# Patient Record
Sex: FEMALE | Race: BLACK OR AFRICAN AMERICAN
[De-identification: names, ages, dates, MRNs, and addresses within clinical notes are randomized per-mention and may not be internally consistent; named-entity substitution may affect disease eponyms.]

---

## 2020-10-08 ENCOUNTER — HOSPITAL ENCOUNTER (INPATIENT)
Dept: HOSPITAL 62 - LC | Age: 20
LOS: 2 days | Discharge: HOME | End: 2020-10-10
Attending: OBSTETRICS & GYNECOLOGY | Admitting: OBSTETRICS & GYNECOLOGY
Payer: MEDICAID

## 2020-10-08 DIAGNOSIS — O32.2XX0: ICD-10-CM

## 2020-10-08 DIAGNOSIS — Z3A.39: ICD-10-CM

## 2020-10-08 LAB
ADD MANUAL DIFF: NO
APPEARANCE UR: (no result)
APTT PPP: YELLOW S
BARBITURATES UR QL SCN: NEGATIVE
BASOPHILS # BLD AUTO: 0 10^3/UL (ref 0–0.2)
BASOPHILS NFR BLD AUTO: 0.2 % (ref 0–2)
BILIRUB UR QL STRIP: NEGATIVE
EOSINOPHIL # BLD AUTO: 0 10^3/UL (ref 0–0.6)
EOSINOPHIL NFR BLD AUTO: 0 % (ref 0–6)
ERYTHROCYTE [DISTWIDTH] IN BLOOD BY AUTOMATED COUNT: 14.6 % (ref 11.5–14)
GLUCOSE UR STRIP-MCNC: NEGATIVE MG/DL
HCT VFR BLD CALC: 38.7 % (ref 36–47)
HGB BLD-MCNC: 13.2 G/DL (ref 12–15.5)
KETONES UR STRIP-MCNC: NEGATIVE MG/DL
LYMPHOCYTES # BLD AUTO: 0.8 10^3/UL (ref 0.5–4.7)
LYMPHOCYTES NFR BLD AUTO: 5.7 % (ref 13–45)
MCH RBC QN AUTO: 29.5 PG (ref 27–33.4)
MCHC RBC AUTO-ENTMCNC: 34.1 G/DL (ref 32–36)
MCV RBC AUTO: 86 FL (ref 80–97)
METHADONE UR QL SCN: NEGATIVE
MONOCYTES # BLD AUTO: 0.4 10^3/UL (ref 0.1–1.4)
MONOCYTES NFR BLD AUTO: 2.9 % (ref 3–13)
NEUTROPHILS # BLD AUTO: 13.2 10^3/UL (ref 1.7–8.2)
NEUTS SEG NFR BLD AUTO: 91.2 % (ref 42–78)
NITRITE UR QL STRIP: NEGATIVE
PCP UR QL SCN: NEGATIVE
PH UR STRIP: 6 [PH] (ref 5–9)
PLATELET # BLD: 152 10^3/UL (ref 150–450)
PROT UR STRIP-MCNC: 30 MG/DL
RBC # BLD AUTO: 4.48 10^6/UL (ref 3.72–5.28)
SP GR UR STRIP: 1.02
TOTAL CELLS COUNTED % (AUTO): 100 %
URINE AMPHETAMINES SCREEN: NEGATIVE
URINE BENZODIAZEPINES SCREEN: NEGATIVE
URINE COCAINE SCREEN: NEGATIVE
URINE MARIJUANA (THC) SCREEN: NEGATIVE
UROBILINOGEN UR-MCNC: NEGATIVE MG/DL (ref ?–2)
WBC # BLD AUTO: 14.5 10^3/UL (ref 4–10.5)

## 2020-10-08 PROCEDURE — 86900 BLOOD TYPING SEROLOGIC ABO: CPT

## 2020-10-08 PROCEDURE — 81005 URINALYSIS: CPT

## 2020-10-08 PROCEDURE — 86901 BLOOD TYPING SEROLOGIC RH(D): CPT

## 2020-10-08 PROCEDURE — 85027 COMPLETE CBC AUTOMATED: CPT

## 2020-10-08 PROCEDURE — 85025 COMPLETE CBC W/AUTO DIFF WBC: CPT

## 2020-10-08 PROCEDURE — 88307 TISSUE EXAM BY PATHOLOGIST: CPT

## 2020-10-08 PROCEDURE — 80307 DRUG TEST PRSMV CHEM ANLYZR: CPT

## 2020-10-08 PROCEDURE — 36415 COLL VENOUS BLD VENIPUNCTURE: CPT

## 2020-10-08 PROCEDURE — 86592 SYPHILIS TEST NON-TREP QUAL: CPT

## 2020-10-08 PROCEDURE — 0HQ9XZZ REPAIR PERINEUM SKIN, EXTERNAL APPROACH: ICD-10-PCS | Performed by: MIDWIFE

## 2020-10-08 PROCEDURE — 86850 RBC ANTIBODY SCREEN: CPT

## 2020-10-08 RX ADMIN — FAMOTIDINE SCH: 20 TABLET, FILM COATED ORAL at 23:50

## 2020-10-08 RX ADMIN — METHYLERGONOVINE SCH MG: 0.2 TABLET ORAL at 23:47

## 2020-10-08 RX ADMIN — IBUPROFEN SCH MG: 800 TABLET, FILM COATED ORAL at 23:47

## 2020-10-08 NOTE — ADMISSION PHYSICAL
=================================================================



=================================================================

Datetime Report Generated by CPN: 10/08/2020 12:45

   

   

=================================================================

CURRENT ADMISSION

=================================================================

   

Chief Complaint:  Uterine Contractions

Indication for Induction:  Not Applicable

Admit Impression :  Term, Intrauterine Pregnancy; Active Labor

Admit Plan:  Admit to Unit; Initiate Labor Protocol

   

=================================================================

ALLERGIES

=================================================================

   

Medication Allergies:  Yes

Medication Allergies:  Penicillins (10/08/)

Latex:  No Latex Allergies

   

=================================================================

OBSTETRICAL HISTORY

=================================================================

   

EDC:  10/12/2020 00:00

:  2

Para:  1

Term:  1

:  0

SAB:  0

IAB:  0

Ectopic:  0

Livin

Cesareans:  0

VBACs:  0

Multiple Births:  0

Gestational Diabetes:  No

Rh Sensitization:  No

Incompetent Cervix:  No

FRANCISCO:  No

Infertility:  No

ART Treatment:  No

Uterine Anomaly:  No

IUGR:  No

Hx Previous C/S:  No

Macrosomia:  No

Hx Loss/Stillborn:  No

PIH:  No

Hx  Death:  No

Placenta Previa/Abruption:  No

Depression/PP Depression:  No

PTL/PROM:  No

Post Partum Hemorrhage:  No

Current Pregnancy Procedures:  Ultrasound

Obstetrical History Comments:  2019 , no epdiural, girl 

   G2- Current 

   

=================================================================

***SEE PRENATAL RECORDS***

=================================================================

   

Alcohol:  No

Marijuana :  No

Cocaine:  No

Other Illicit Drugs:  No

Cigarettes:  Never Smoker. [id number]

   

=================================================================

MEDICAL HISTORY

=================================================================

   

Diabetes:  No

Blood Transfusion:  No

Pulmonary Disease (Asthma, TB):  No

Breast Disease:  No

Hypertension:  No

Gyn Surgery:  No

Heart Disease:  No

Hosp/Surgery:  Yes

Autoimmune Disorder:  No

Anesthetic Complications:  No

Kidney Disease:  No

Abnormal Pap Smear:  Yes

Neuro/Epilepsy:  No

Psychiatric Disorders:  No

Other Medical Diseases:  No

Hepatitis/Liver Disease:  No

Significant Family History:  No

Varicosities/Phlebitis:  No

Trauma/Violence :  No

Thyroid Dysfunction:  No

Medical History Comments:  Was suppose to go back for a 2nd pap smear

   but got pregnant and never got tested again. 

   

=================================================================

INFECTIOUS HISTORY

=================================================================

   

Gonorrhea:  No

Genital Herpes:  No

Chlamydia:  No

Tuberculosis:  No

Syphilis:  No

Hepatitis:  No

HIV/AIDS Exposure:  No

Rash or Viral Illness:  No

HPV:  No

   

=================================================================

PHYSICAL EXAM

=================================================================

   

General:  Normal

HEENT:  Normal

Neurologic:  Normal

Thyroid:  Deferred

Heart:  Normal

Lungs:  Normal

Breast:  Deferred

Back:  Normal

Abdomen:  Normal

Genitourinary Exam:  Deferred

Extremities:  Normal

DTRs:  Normal

Pelvic Type:  Adequate

Vital Signs:  Reviewed

   

=================================================================

VAGINAL EXAM

=================================================================

   

Dilatation:  5

Effacement:  100

Station:  -1

   

=================================================================

MEMBRANES

=================================================================

   

Membranes:  Intact

   

=================================================================

FETUS A

=================================================================

   

EGA:  39.3

Monitoring:  External US

FHR- Baseline:  130

Variability:  Moderate 6-25bpm

Accelerations:  15X15

FHR Category:  Category I

Fetal Presentation:  Vertex

Admit Comment:  

   Closely-spaced pregnancies

   Prenatal records on chart

   Plan admit, pain management.

   Anticipate 

   Proven for 7lbs 10 oz

   

=================================================================

PLANS FOR LABOR AND DELIVERY

=================================================================

   

Labor and Delivery:  None

Pain Management:  None

Feeding Preference:  Breast

Benefit of Breast Feed Discussed:  Yes

Circumcision:  Yes

   

=================================================================

INFORMED CONSENT

=================================================================

   

Assignment:  Kelsey Orourke MD

Signature:  Electronically signed by Rosalina Palmer CNM on 10/8/2020 at

   12:45  with User ID: [id number]

:  Electronically signed by Rosalina Palmer CNM on 10/8/2020 at 12:45 

   with User ID: [id number]

:  I personally evaluated and examined the patient in conjunction with

   the MLP and agree with the assessment, treatment plan and

   disposition.

## 2020-10-08 NOTE — BIRTH CERTIFICATE DATA
=================================================================

Birth Cert Data

=================================================================

Datetime Report Generated by ABIODUN: 10/08/2020 21:15

   

   

=================================================================

BIRTH CERTIFICATE DATA

=================================================================

   

 47a. Prenatal Care:  Yes    (10/08/2020 10:12:Maggie Franklin RN)

 47b. Date of First Visit:  2020 00:00    (10/08/2020

   10:12:Aleta Bahena RN)

 47c. Date of Last Visit:  10/06/2020 00:00    (10/08/2020

   10:12:Aleta Bahena RN)

 47d. Number of Prenatal Visits:  12    (10/08/2020 10:12:Aleta Bahena RN)

 48a. Number of Prev Live Births:  1    (10/08/2020 10:12:Maggie Franklin RN)

 48b. Now Livin    (10/08/2020 10:12:Maggie Franklin RN)

 48c. Live Births Now Dead:  0    (10/08/2020 10:12:QS system process)

 48d. Date of Last Live Birth:  2019 00:00    (10/08/2020

   10:12:Maggie Franklin RN)

 48e. Pregnancy Losses:  0    (10/08/2020 10:12:Maggie Franklin RN)

   

=================================================================

RISK FACTORS IN THIS PREGNANCY

=================================================================

   

 49a. Diabetes:  No    (10/08/2020 10:12:Maggie Franklin RN)

 49b. Hypertension:  No    (10/08/2020 10:12:Maggie Franklin RN)

 49c. Previous  Births:  0    (10/08/2020 10:12:Maggie Franklin RN)

 49d. Stillborns:  No    (10/08/2020 10:12:Maggie Franklin RN)

 49d. IUGR:  No    (10/08/2020 10:12:Maggie Franklin RN)

 49e. Infertility Treatment:  No    (10/08/2020 10:12:Maggie Franklin RN)

 49f. Previous Cesareans:  0    (10/08/2020 10:12:Maggie Franklin RN)

   

=================================================================

Mother's Height

=================================================================

   

 50b. Height Inches:  67    (10/08/2020 10:02:QS system process)

   

=================================================================

Mother's Weight 

=================================================================

   

 51a. Pre-Pregnancy Weight (lbs):  180    (10/08/2020 10:12:Maggie Franklin RN)

 51b. Weight at Delivery (lbs):  209    (10/08/2020 10:02:QS system

   process)

 52. Dt Last Normal Menses Began:  2020 00:00    (10/08/2020

   10:12:Maggie Franklin RN)

   

=================================================================

Infections Present/Treated

=================================================================

   

 53a. Gonorrhea:  No    (10/08/2020 10:12:Maggie Franklin RN)

 Results this Hospital Visit :  Negative    (10/08/2020 10:12:BARBRA Will)

 53b. Syphilis:  No    (10/08/2020 10:12:Maggie Franklin RN)

 53c. Chlamydia:  No    (10/08/2020 10:12:Maggie Franklin RN)

 Results this Hospital Visit:  Negative    (10/08/2020 10:12:BARBRA Will)

 53d. Hepatitis B:  No    (10/08/2020 10:12:Maggie Franklin RN)

 Results this Hospital Visit:  Negative    (10/08/2020 10:12:Maggie Franklin RN)

 53e. Hepatitis C:  Negative    (10/08/2020 10:12:Aleta Bahena RN)

 53h. Mother Tested for HBsAG:  Yes    (10/08/2020 10:12:BARBRA Will)

 53i. Date Tested:  2020 00:00    (10/08/2020 10:12:BARBRA Will)

 53j. Test Result:  Negative    (10/08/2020 10:12:Maggie Franklin RN)

   

=================================================================

Obstetric Procedures 

=================================================================

   

 54a, b, c. Obstetric Procedures:  Ultrasound    (10/08/2020

   10:12:Maggie Franklin RN)

   

=================================================================

Cigarette Smoking 

=================================================================

   

 Cigarette Smoking:  Never Smoker. [id number]    (10/08/2020

   10:12:Maggie Franklin RN)

 55a. 3 Months Before Preg - Ci    (10/08/2020 10:12:Maggie Franklin RN)

   55a. Packs:  0    (10/08/2020 10:12:Maggie Franklin RN)

 55b. 1st Trimester of Preg- Ci    (10/08/2020 10:12:Maggie Franklin RN)

   55b. Packs:  0    (10/08/2020 10:12:Maggie Franklin RN)

 55c. 2nd Trimester of Preg- Ci    (10/08/2020 10:12:Maggie Franklin RN)

   55c. Packs:  0    (10/08/2020 10:12:Maggie Franklin RN)

 55d. 3rd Trimester of Preg- Ci    (10/08/2020 10:12:Maggie Franklin RN)

   55d. Packs:  0    (10/08/2020 10:12:Maggie Franklin RN)

   

=================================================================

Onset of Labor

=================================================================

   

 56a. PROM >12 Hrs:  1.87    (10/08/2020 10:12:QS system process)

 56b. Precipitous Labor <3 Hrs:  4    (10/08/2020 10:12:QS system

   process)

 56c. Prolonged Labor > 20 Hrs:  4    (10/08/2020 10:12:QS system

   process)

   

=================================================================



=================================================================

   

 57a. Induction of Labor:  N/A    (10/08/2020 10:12:Swati Camp, [facility])

 57c. Non-Vertex Presentation A:  Vertex    (10/08/2020 10:12:Swati Edmonds [facility])

 57d. Steroids - Fetal Lung Mat:  None    (10/08/2020 10:12:Rosalina Palmer CNM)

 57d. Steroids - Fetal Lung Mat:  Not Applicable    (10/08/2020

   10:12:Swati Edmonds [facility])

 57f. Mat Chorio or Temp >100.4:  98.4    (10/08/2020 10:12:Dylon Padilla RN)

 57g. Moderate/Heavy Meconium:  Clear    (10/08/2020 16:33:Maggie Franklin RN)

 57h. Fetal Intolerance of Labor:  N/A    (10/08/2020 10:12:Swati Edmonds

   [facility])

:  N/A    (10/08/2020 10:12:Swati Edmonds [facility])

 57i. Epidural/Spinal Anesthesia:  None    (10/08/2020 10:12:Swati Edmonds [facility])

   

=================================================================

Method of Delivery

=================================================================

   

 58a. Forceps - Unsuccessful A:  N/A    (10/08/2020 10:12:Swati Edmonds

   [facility])

 58b. Vacuum - Unsuccessful A:  N/A    (10/08/2020 10:12:Swati Edmonds,

   [facility])

   

=================================================================

58c. Presentation at Birth

=================================================================

   

 58c. Presentation at Birth - A :  Vertex    (10/08/2020 10:12:[facility], [facility])

 58c. Presentation at Birth - A :  N/A    (10/08/2020 10:12:[facility])

 58c. Presentation at Birth - A :  Cephalic    (10/08/2020 10:06:Maggie Franklin, RN)

   

=================================================================

Final Route and Method of Del 

=================================================================

   

 58d. Baby A Route/Delivery:  Vaginal    (10/08/2020 10:12:[facility])

 58e. Trial of Labor Attempted:  No    (10/08/2020 10:12:[facility])

 58e. Trial of Labor Attempted A:  N/A    (10/08/2020 10:12:[facility])

   

=================================================================

Maternal Morbidity

=================================================================

   

 59b. 3rd or 4th Degree Lacs:  Perineal    (10/08/2020 10:12:Rosalina

   Palmer, CNM)

   

=================================================================



=================================================================

   

 Birthweight Baby A:  3460    (10/08/2020 10:12:Dylon Padilla RN)

 60a. Pounds :  7    (10/08/2020 10:12:QS system process)

 60b. Ounces:  10    (10/08/2020 10:12:QS system process)

   

=================================================================

61. GA at Delivery 

=================================================================

   

 Baby A:  38.3    (10/08/2020 10:12:[facility], [facility])

:  Early Term- 37- 38.6 Weeks    (10/08/2020 10:12:QS system process)

   

=================================================================

62a. APGAR 5 Minute

=================================================================

   

 Baby A:  9    (10/08/2020 10:12:QS system process)

## 2020-10-09 LAB
ERYTHROCYTE [DISTWIDTH] IN BLOOD BY AUTOMATED COUNT: 14.4 % (ref 11.5–14)
HCT VFR BLD CALC: 35.2 % (ref 36–47)
HGB BLD-MCNC: 11.7 G/DL (ref 12–15.5)
MCH RBC QN AUTO: 29 PG (ref 27–33.4)
MCHC RBC AUTO-ENTMCNC: 33.2 G/DL (ref 32–36)
MCV RBC AUTO: 87 FL (ref 80–97)
PLATELET # BLD: 138 10^3/UL (ref 150–450)
RBC # BLD AUTO: 4.04 10^6/UL (ref 3.72–5.28)
WBC # BLD AUTO: 12.4 10^3/UL (ref 4–10.5)

## 2020-10-09 RX ADMIN — FAMOTIDINE SCH MG: 20 TABLET, FILM COATED ORAL at 09:49

## 2020-10-09 RX ADMIN — DOCUSATE SODIUM SCH MG: 100 CAPSULE, LIQUID FILLED ORAL at 17:29

## 2020-10-09 RX ADMIN — FAMOTIDINE SCH MG: 20 TABLET, FILM COATED ORAL at 21:59

## 2020-10-09 RX ADMIN — IBUPROFEN SCH MG: 800 TABLET, FILM COATED ORAL at 21:59

## 2020-10-09 RX ADMIN — METHYLERGONOVINE SCH MG: 0.2 TABLET ORAL at 21:59

## 2020-10-09 RX ADMIN — DOCUSATE SODIUM SCH MG: 100 CAPSULE, LIQUID FILLED ORAL at 09:49

## 2020-10-09 RX ADMIN — METHYLERGONOVINE SCH MG: 0.2 TABLET ORAL at 17:29

## 2020-10-09 RX ADMIN — IBUPROFEN SCH MG: 800 TABLET, FILM COATED ORAL at 06:42

## 2020-10-09 RX ADMIN — METHYLERGONOVINE SCH MG: 0.2 TABLET ORAL at 04:18

## 2020-10-09 RX ADMIN — Medication SCH CAP: at 09:49

## 2020-10-09 RX ADMIN — METHYLERGONOVINE SCH MG: 0.2 TABLET ORAL at 09:49

## 2020-10-09 RX ADMIN — FERROUS SULFATE TAB 325 MG (65 MG ELEMENTAL FE) SCH MG: 325 (65 FE) TAB at 17:29

## 2020-10-09 RX ADMIN — SENNOSIDES, DOCUSATE SODIUM SCH EACH: 50; 8.6 TABLET, FILM COATED ORAL at 09:49

## 2020-10-09 RX ADMIN — IBUPROFEN SCH MG: 800 TABLET, FILM COATED ORAL at 14:20

## 2020-10-09 RX ADMIN — FERROUS SULFATE TAB 325 MG (65 MG ELEMENTAL FE) SCH MG: 325 (65 FE) TAB at 09:49

## 2020-10-09 NOTE — PDOC PROGRESS REPORT
Subjective-OB


Progress Note for:: 10/09/20


Subjective: 


Pt doing well, no concerns. Bleeding normal, reg diet and voids w/o difficulty. 







Physical Exam (OB)


Vital Signs: 


                                        











Temp Pulse Resp BP Pulse Ox


 


 98.1 F   92   18   114/73   100 


 


 10/09/20 11:20  10/09/20 11:20  10/09/20 11:20  10/09/20 11:20  10/09/20 07:50








                                 Intake & Output











 10/08/20 10/09/20 10/10/20





 06:59 06:59 06:59


 


Output Total  350 120


 


Balance  -350 -120


 


Weight  94.9 kg 














- PIH/Pre-Eclampsia


Clonus: Negative


Headache: Absent


Epigastric Pain: No


Visual Changes: No





- Maternal Morbidity


59. Maternal Morbidity (serious complications experinced by the mother 

associated with labor and delivery: None of the above





- Lochia


Lochia Amount: Small 10-25 ml


Lochia Color: Rubra/Red





- Abdomen


Description: Tender


Hernia Present: No


Fundal Description: Firm, Midline


Fundal Height: u/u - u/2





Objective-Diagnostic


Laboratory: 


                                        





                                 10/09/20 07:25 





                                        











  10/08/20 10/08/20 10/09/20





  14:00 14:00 07:25


 


WBC  14.5 H   12.4 H


 


RBC  4.48   4.04


 


Hgb  13.2   11.7 L


 


Hct  38.7   35.2 L


 


MCV  86   87


 


MCH  29.5   29.0


 


MCHC  34.1   33.2


 


RDW  14.6 H   14.4 H


 


Plt Count  152   138 L


 


Seg Neutrophils %  91.2 H  


 


Blood Type   B POSITIVE 


 


Antibody Screen   NEGATIVE 














Assessment and Plan(PN)





- Assessment and Plan


(1) First degree perineal laceration during delivery


Is this a current diagnosis for this admission?: Yes   





(2) Active labor at term


Is this a current diagnosis for this admission?: Yes   





(3)  (normal spontaneous vaginal delivery)


Is this a current diagnosis for this admission?: Yes   





- Time Spent with Patient


Time with patient: Less than 15 minutes


Medications reviewed and adjusted accordingly: Yes





- Disposition


Anticipated Discharge Disposition: Home, Self Care


Anticipated Discharge Timeframe: within 48 hours

## 2020-10-10 VITALS — DIASTOLIC BLOOD PRESSURE: 69 MMHG | SYSTOLIC BLOOD PRESSURE: 132 MMHG

## 2020-10-10 RX ADMIN — IBUPROFEN SCH MG: 800 TABLET, FILM COATED ORAL at 05:19

## 2020-10-10 RX ADMIN — FERROUS SULFATE TAB 325 MG (65 MG ELEMENTAL FE) SCH MG: 325 (65 FE) TAB at 09:48

## 2020-10-10 RX ADMIN — Medication SCH CAP: at 09:48

## 2020-10-10 RX ADMIN — FAMOTIDINE SCH MG: 20 TABLET, FILM COATED ORAL at 09:48

## 2020-10-10 RX ADMIN — SENNOSIDES, DOCUSATE SODIUM SCH EACH: 50; 8.6 TABLET, FILM COATED ORAL at 09:49

## 2020-10-10 RX ADMIN — DOCUSATE SODIUM SCH MG: 100 CAPSULE, LIQUID FILLED ORAL at 09:48

## 2020-10-10 NOTE — PDOC DISCHARGE SUMMARY
Impression





- Admit/DC Date/PCP


Admission Date/Primary Care Provider: 


  10/08/20 12:07





  CESAR GARCIA MD





Discharge Date: 10/10/20





- Discharge Diagnosis


(1) First degree perineal laceration during delivery


Is this a current diagnosis for this admission?: Yes   





(2) Active labor at term


Is this a current diagnosis for this admission?: Yes   





(3)  (normal spontaneous vaginal delivery)


Is this a current diagnosis for this admission?: Yes   





- Additional Information


Resuscitation Status: Full Code


Discharge Diet: Regular


Discharge Activity: Balance Activity w/Rest, Pelvic Rest


Referrals: 


CESRA GARCIA MD [Primary Care Provider] - 


Prescriptions: 


Ibuprofen [Motrin 800 mg Tablet] 800 mg PO Q8HP PRN #60 tablet


 PRN Reason: 


Home Medications: 








Pnv No.95/Ferrous Fum/Folic AC [Prenatal Caplet] 1 each PO DAILY 10/08/20 


Ibuprofen [Motrin 800 mg Tablet] 800 mg PO Q8HP PRN #60 tablet 10/10/20 











HPI


Gestational Age: 39.3


Reason(s) for Admission: Onset of Labor


Prenatal Procedures: NST


Intrapartum Procedure(s): Spontaneous Vaginal Delivery


Postpartum Complication(s): Laceration-Perineal, Hemorrhage-Uterine Atony


Laceration-Degree: 1st


Postpartum Complication(s) Note: 





given methergine





Hospital Course


59. Maternal Morbidity (serious complications experinced by the mother 

associated with labor and delivery: None of the above





Results


Laboratory Results: 


                                        











WBC  12.4 10^3/uL (4.0-10.5)  H  10/09/20  07:25    


 


RBC  4.04 10^6/uL (3.72-5.28)   10/09/20  07:25    


 


Hgb  11.7 g/dL (12.0-15.5)  L  10/09/20  07:25    


 


Hct  35.2 % (36.0-47.0)  L  10/09/20  07:25    


 


MCV  87 fl (80-97)   10/09/20  07:25    


 


MCH  29.0 pg (27.0-33.4)   10/09/20  07:25    


 


MCHC  33.2 g/dL (32.0-36.0)   10/09/20  07:25    


 


RDW  14.4 % (11.5-14.0)  H  10/09/20  07:25    


 


Plt Count  138 10^3/uL (150-450)  L  10/09/20  07:25    


 


Lymph % (Auto)  5.7 % (13-45)  L  10/08/20  14:00    


 


Mono % (Auto)  2.9 % (3-13)  L  10/08/20  14:00    


 


Eos % (Auto)  0.0 % (0-6)   10/08/20  14:00    


 


Baso % (Auto)  0.2 % (0-2)   10/08/20  14:00    


 


Absolute Neuts (auto)  13.2 10^3/uL (1.7-8.2)  H  10/08/20  14:00    


 


Absolute Lymphs (auto)  0.8 10^3/uL (0.5-4.7)   10/08/20  14:00    


 


Absolute Monos (auto)  0.4 10^3/uL (0.1-1.4)   10/08/20  14:00    


 


Absolute Eos (auto)  0.0 10^3/uL (0.0-0.6)   10/08/20  14:00    


 


Absolute Basos (auto)  0.0 10^3/uL (0.0-0.2)   10/08/20  14:00    


 


Seg Neutrophils %  91.2 % (42-78)  H  10/08/20  14:00    


 


Urine Color  YELLOW   10/08/20  09:58    


 


Urine Appearance  CLOUDY   10/08/20  09:58    


 


Urine pH  6.0  (5.0-9.0)   10/08/20  09:58    


 


Ur Specific Gravity  1.021   10/08/20  09:58    


 


Urine Protein  30 mg/dL (NEGATIVE)  H  10/08/20  09:58    


 


Urine Glucose (UA)  NEGATIVE mg/dL (NEGATIVE)   10/08/20  09:58    


 


Urine Ketones  NEGATIVE mg/dL (NEGATIVE)   10/08/20  09:58    


 


Urine Blood  NEGATIVE  (NEGATIVE)   10/08/20  09:58    


 


Urine Nitrite  NEGATIVE  (NEGATIVE)   10/08/20  09:58    


 


Urine Bilirubin  NEGATIVE  (NEGATIVE)   10/08/20  09:58    


 


Urine Urobilinogen  NEGATIVE mg/dL (<2.0)   10/08/20  09:58    


 


Ur Leukocyte Esterase  MODERATE  (NEGATIVE)  H  10/08/20  09:58    


 


Urine Ascorbic Acid  40  (NEGATIVE)  H  10/08/20  09:58    


 


Urine Opiates Screen  NEGATIVE   10/08/20  09:58    


 


Urine Methadone Screen  NEGATIVE   10/08/20  09:58    


 


Ur Barbiturates Screen  NEGATIVE   10/08/20  09:58    


 


Ur Phencyclidine Scrn  NEGATIVE   10/08/20  09:58    


 


Ur Amphetamines Screen  NEGATIVE   10/08/20  09:58    


 


U Benzodiazepines Scrn  NEGATIVE   10/08/20  09:58    


 


Urine Cocaine Screen  NEGATIVE   10/08/20  09:58    


 


U Marijuana (THC) Screen  NEGATIVE   10/08/20  09:58    


 


RPR  NONREACTIVE  (NONREACTIVE)   10/08/20  14:00    


 


Blood Type  B POSITIVE   10/08/20  14:00    


 


Antibody Screen  NEGATIVE   10/08/20  14:00    














Plan


Plan of Treatment: 


discharge home f/u at [facility] 4 wks


Time Spent: Less than 30 Minutes

## 2024-03-11 NOTE — DELIVERY SUMMARY
=================================================================

Del Sum A-C

=================================================================

Datetime Report Generated by CPN: 10/08/2020 21:15

   

   

=================================================================

DELIVERY PERSONNEL

=================================================================

   

DELIVERY PERSONNEL:  [id number]

Delivery Doctor::  Rosalina Palmer CNM

Nurse Midwife Certified::  Rosalina Palmer CNM

Labor and Delivery Nurse::  Maggie Franklin RN

Labor and Delivery Nurse::  BARBRA Bowman

Scrub Tech/CNA:  Carina [facility], CST

   

=================================================================

MATERNAL INFORMATION

=================================================================

   

Delivery Anesthesia:  None

Medications After Delivery:  Pitocin Bolus-Please Comment; Pitocin 30

   Units in 500ml NS/D5W

Delivery QBL:  200

Maternal Complications:  None

Provider Comments:  Called to room 1-patient complete and at +2. 

   Patient positioned with stirrups and instructed to push with

   contractions.   of live male in vertex BABAR at 1825.  Compound

   right hand reduced.  Nuchal cord x1 reduced.  Spontaneous

   respirations and cry.  3 vessel cord.  Apgars 8-9.  Cord clamped x2

   after 2min delay, then cut by FOB.  Placenta, membranes, and cord

   expelled at 1831, Hernandez presentation, intact.  Perineal tear

   repaired as above.  Patient tolerated procedure well.

   

=================================================================

LABOR SUMMARY

=================================================================

   

EDC:  10/12/2020 00:00

No. Babies in Womb:  1

 Attempted:  No

Labor Anesthesia:  None

   

=================================================================

LABOR INFORMATION

=================================================================

   

Reason for Induction:  Not Applicable

Onset of Labor:  10/08/2020 14:23

Complete Dilatation:  10/08/2020 18:16

Oxytocin:  N/A

Group B Beta Strep:  negative

Steroids Given:  None

Reason Steroids Not Administered:  Not Applicable

   

=================================================================

MEMBRANES

=================================================================

   

Membranes Rupture Method:  Artificial

Rupture of Membranes:  10/08/2020 16:33

Length of Rupture (hr):  1.87

Amniotic Fluid Color:  Clear

Amniotic Fluid Amount:  Scant

Amniotic Fluid Odor:  Normal

   

=================================================================

STAGES OF LABOR

=================================================================

   

Stage 1 hr:  3

Stage 1 min:  53

Stage 2 hr:  0

Stage 2 min:  9

Stage 3 hr:  0

Stage 3 min:  6

Total Time in Labor hr:  4

Total Time in Labor min:  8

   

=================================================================

VAGINAL DELIVERY

=================================================================

   

Episiotomy:  None

Laceration #1:  Perineal

Laceration Extension #1:  First Degree

Laceration Repair:  Yes

Laceration Repair Note:  Single interrupted stitch done in perineal

   tear with 3-0 Chromic

Sponge Count Correct:  N/A

Sharps Count Correct:  Yes

   

=================================================================

CSECTION DELIVERY

=================================================================

   

Primary Indication:  N/A

Secondary Indication:  N/A

CSection Incidence:  N/A

Labor:  N/A

Elective:  N/A

CSection Incision:  N/A

   

=================================================================

BABY A INFORMATION

=================================================================

   

Infant Delivery Date/Time:  10/08/2020 18:25

Method of Delivery:  Vaginal

Nurse Controlled Delivery:  No

Born in Route :  No

:  N/A

Forceps:  N/A

Vacuum Extraction:  N/A

Shoulder Dystocia :  No

   

=================================================================

PRESENTATION/POSITION BABY A

=================================================================

   

Presentation:  Cephalic

Cephalic Presentation:  Vertex

Vertex Position:  Right Occipital Anterior

Breech Presentation:  N/A

   

=================================================================

PLACENTA INFORMATION BABY A

=================================================================

   

Placenta Delivery Time :  10/08/2020 18:31

Placenta Method of Delivery:  Spontaneous

Placenta Status:  Delivered

   

=================================================================

APGAR SCORES BABY A

=================================================================

   

Heart Rate 1 min:  >100 bpm

Resp Effort 1 min:  Good Cry

Reflex Irritability 1 min:  Cough or Sneeze or Pulls Away

Muscle Tone 1 min:  Active Motion

Color 1 min:  Blue/Pale

Resuscitation Effort 1 min:  Tactile Stimulation

APGAR SCORE 1 MIN:  8

Heart Rate 5 min:  >100 bpm

Resp Effort 5 min:  Good Cry

Reflex Irritability 5 min:  Cough or Sneeze or Pulls Away

Muscle Tone 5 min:  Active Motion

Color 5 min:  Body Pink, Extremities Blue

Resuscitation Effort 5 min:  N/A

APGAR SCORE 5 MIN:  9

Resuscitation Effort 10 min:  N/A

   

=================================================================

INFANT INFORMATION BABY A

=================================================================

   

Gestational Age at Delivery:  38.3

Gestational Status:  Early Term- 37- 38.6 Weeks

Infant Outcome :  Liveborn

Infant Condition :  Stable

Infant Sex:  Male

   

=================================================================

IDENTIFICATION BABY A

=================================================================

   

Infant Verification Date/Time:  10/08/2020 19:24

ID Band Number:  [id number]

Mother's Name Verified:  Yes

Infant Medical Record Number:  [id number]

RN Verifying Infant:  AKristi Franklin RN

Additional Verifying Personnel:  LEATHA Rao RN

   

=================================================================

WEIGHT/LENGTH BABY A

=================================================================

   

Infant Birthweight (gm):  3460

Infant Weight (lb):  7

Infant Weight (oz):  10

Infant Length (in):  21.00

Infant Length (cm):  53.34

   

=================================================================

CORD INFORMATION BABY A

=================================================================

   

No. Cord Vessels:  3

Nuchal Cord :  Around Neck x1, Loose

Cord Blood Taken:  Yes-For Storage (Mom's Blood type +)

Infant Suction:  None

   

=================================================================

ASSESSMENT BABY A

=================================================================

   

Infant Complications:  None

Physical Findings at Delivery:  Within Normal Limits

Infant Respirations:  Appears Normal

Skin to Skin:  Yes

Neonatologist/ALS Called :  No

Infant Care By:  AAKASH Franklin RN

Transferred To:  Remains with Mother

   

=================================================================

SIGNATURES

=================================================================

   

Assignment:  Kelsey Orourke MD

Signature:  Electronically signed by Rosalina Palmer CNM on 10/8/2020 at

   18:52  with User ID: [email or web address]

:  Electronically signed by Rosalina Palmer CNM on 10/8/2020 at 18:52 

   with User ID: [email or web address]

:  I personally evaluated and examined the patient in conjunction with

   the MLP and agree with the assessment, treatment plan and

   disposition. [Hyperlipidemia] : hyperlipidemia [CV Risk Factors and Non-Cardiac Disease] : CV risk factors and non-cardiac disease [FreeTextEntry1] : This is a 38-year-old male presenting for lipid consultation due to abnormal lipid panel.  The patient is followed closely by his cardiologist Dr. Sariah Su. Past medical history significant for hypertension, Marfan syndrome, paroxysmal supraventricular tachycardia, status post aortic aneurysm repair 6/16/15 with residual aortic regurgitation.  Family history significant for high cholesterol in both his parents and coronary artery disease in his mother.  Surgical history significant for descending aorta repair 2015.  Social history significant for about one cigar once a year and social alcohol use. Patient works for the school district.  Patient is currently taking Losartan potassium 50 mg daily and regularly follows up with his cardiologist Dr. Su. Patient denies ever taking any lipid lowering medications.   Patient feels generally well today and denies chest pain, shortness of breath, dizziness, or syncope.   Labs 2/27/24 demonstrated cholesterol 186, direct , HDL 49, triglycerides 114, apolipoprotein B 95, lipoprotein(a) 189. Patient was educated on the risks associated with elevated lipoprotein(a). Labs 1/16/24 demonstrated cholesterol 194, , HDL 45, triglycerides 157. EKG 1/16/24 demonstrated sinus bradycardia with rate 59, voltage criteria for LVH, diffuse nonspecific T abnormality.  Transthoracic echocardiogram 1/10/23